# Patient Record
Sex: MALE | Race: WHITE | NOT HISPANIC OR LATINO | Employment: OTHER | URBAN - METROPOLITAN AREA
[De-identification: names, ages, dates, MRNs, and addresses within clinical notes are randomized per-mention and may not be internally consistent; named-entity substitution may affect disease eponyms.]

---

## 2023-10-19 DIAGNOSIS — I10 PRIMARY HYPERTENSION: ICD-10-CM

## 2023-10-19 RX ORDER — METOPROLOL SUCCINATE 25 MG/1
25 TABLET, EXTENDED RELEASE ORAL EVERY 12 HOURS
Qty: 180 TABLET | Refills: 3 | Status: SHIPPED | OUTPATIENT
Start: 2023-10-19 | End: 2023-11-15 | Stop reason: SDUPTHER

## 2023-10-19 RX ORDER — METOPROLOL SUCCINATE 25 MG/1
1 TABLET, EXTENDED RELEASE ORAL EVERY 12 HOURS
COMMUNITY
Start: 2023-07-15 | End: 2023-10-19 | Stop reason: SDUPTHER

## 2023-10-19 NOTE — TELEPHONE ENCOUNTER
Requested Prescriptions     Pending Prescriptions Disp Refills    metoprolol succinate XL (Toprol-XL) 25 mg 24 hr tablet 180 tablet 3     Sig: Take 1 tablet (25 mg) by mouth every 12 hours.     Please send the attached prescription for the patient. They have a follow up scheduled in November. Thank you.    Alcon Whaley MA

## 2023-10-27 ENCOUNTER — TELEPHONE (OUTPATIENT)
Dept: PRIMARY CARE | Facility: CLINIC | Age: 73
End: 2023-10-27
Payer: MEDICARE

## 2023-10-27 DIAGNOSIS — E11.9 TYPE 2 DIABETES MELLITUS WITHOUT COMPLICATION, UNSPECIFIED WHETHER LONG TERM INSULIN USE (MULTI): Primary | ICD-10-CM

## 2023-10-30 RX ORDER — METFORMIN HYDROCHLORIDE 500 MG/1
1000 TABLET, EXTENDED RELEASE ORAL
Qty: 90 TABLET | Refills: 1 | Status: SHIPPED | OUTPATIENT
Start: 2023-10-30 | End: 2024-01-22

## 2023-10-30 RX ORDER — METFORMIN HYDROCHLORIDE 500 MG/1
2 TABLET, EXTENDED RELEASE ORAL
COMMUNITY
Start: 2023-07-14 | End: 2023-10-30 | Stop reason: SDUPTHER

## 2023-11-14 PROBLEM — I24.9 ACUTE CORONARY SYNDROME (MULTI): Status: ACTIVE | Noted: 2023-11-14

## 2023-11-14 PROBLEM — N28.9 RENAL INSUFFICIENCY: Status: ACTIVE | Noted: 2020-07-10

## 2023-11-14 PROBLEM — R00.1 BRADYCARDIA: Status: ACTIVE | Noted: 2023-11-14

## 2023-11-14 PROBLEM — K57.32 DIVERTICULITIS OF COLON: Status: ACTIVE | Noted: 2023-11-14

## 2023-11-14 PROBLEM — R73.01 IMPAIRED FASTING GLUCOSE: Status: ACTIVE | Noted: 2019-02-20

## 2023-11-14 PROBLEM — I21.19 ACUTE ST ELEVATION MYOCARDIAL INFARCTION (STEMI) OF INFERIOR WALL (MULTI): Status: ACTIVE | Noted: 2023-11-14

## 2023-11-14 PROBLEM — N18.1 STAGE 1 CHRONIC KIDNEY DISEASE: Status: ACTIVE | Noted: 2019-04-16

## 2023-11-14 PROBLEM — I10 ESSENTIAL HYPERTENSION: Status: ACTIVE | Noted: 2023-11-14

## 2023-11-14 PROBLEM — R07.9 CHEST PAIN: Status: ACTIVE | Noted: 2023-11-14

## 2023-11-14 PROBLEM — I21.19 INFERIOR MI (MULTI): Status: ACTIVE | Noted: 2018-05-08

## 2023-11-14 PROBLEM — R97.20 ELEVATED PSA: Status: ACTIVE | Noted: 2019-04-16

## 2023-11-14 PROBLEM — C61 MALIGNANT NEOPLASM OF PROSTATE (MULTI): Status: ACTIVE | Noted: 2020-07-10

## 2023-11-14 PROBLEM — R11.2 NAUSEA & VOMITING: Status: ACTIVE | Noted: 2023-11-14

## 2023-11-14 PROBLEM — E78.2 MIXED HYPERLIPIDEMIA: Status: ACTIVE | Noted: 2018-07-25

## 2023-11-14 PROBLEM — R33.9 RETENTION OF URINE: Status: ACTIVE | Noted: 2023-11-14

## 2023-11-14 PROBLEM — I25.118 ATHEROSCLEROTIC HEART DISEASE OF NATIVE CORONARY ARTERY WITH OTHER FORMS OF ANGINA PECTORIS (CMS-HCC): Status: ACTIVE | Noted: 2019-05-30

## 2023-11-14 PROBLEM — N40.1 LOWER URINARY TRACT SYMPTOMS DUE TO BENIGN PROSTATIC HYPERPLASIA: Status: ACTIVE | Noted: 2023-11-14

## 2023-11-14 PROBLEM — N20.0 CALCULUS OF KIDNEY: Status: ACTIVE | Noted: 2023-11-14

## 2023-11-14 PROBLEM — E11.9 DIABETES (MULTI): Status: ACTIVE | Noted: 2019-04-16

## 2023-11-14 PROBLEM — E03.8 SUBCLINICAL HYPOTHYROIDISM: Status: ACTIVE | Noted: 2019-04-16

## 2023-11-14 PROBLEM — I20.9 ANGINA PECTORIS (CMS-HCC): Status: ACTIVE | Noted: 2023-11-14

## 2023-11-14 PROBLEM — K80.20 CHOLELITHIASIS: Status: ACTIVE | Noted: 2023-11-14

## 2023-11-14 PROBLEM — E29.1 TESTICULAR HYPOFUNCTION: Status: ACTIVE | Noted: 2023-11-14

## 2023-11-14 RX ORDER — LEUPROLIDE ACETATE 45 MG
45 KIT SUBCUTANEOUS
COMMUNITY

## 2023-11-14 RX ORDER — METOPROLOL SUCCINATE 25 MG/1
25 TABLET, EXTENDED RELEASE ORAL 2 TIMES DAILY
COMMUNITY
Start: 2019-05-30 | End: 2023-11-15 | Stop reason: SDUPTHER

## 2023-11-14 RX ORDER — ATORVASTATIN CALCIUM 80 MG/1
80 TABLET, FILM COATED ORAL DAILY
COMMUNITY
Start: 2019-07-11 | End: 2024-02-15

## 2023-11-14 RX ORDER — NITROGLYCERIN 0.4 MG/1
0.4 TABLET SUBLINGUAL EVERY 5 MIN PRN
COMMUNITY
Start: 2019-05-30

## 2023-11-14 RX ORDER — TAMSULOSIN HYDROCHLORIDE 0.4 MG/1
0.4 CAPSULE ORAL DAILY
COMMUNITY
End: 2024-03-05 | Stop reason: ALTCHOICE

## 2023-11-14 RX ORDER — SILDENAFIL 100 MG/1
100 TABLET, FILM COATED ORAL AS NEEDED
COMMUNITY
End: 2024-03-05 | Stop reason: ALTCHOICE

## 2023-11-14 RX ORDER — EZETIMIBE 10 MG/1
10 TABLET ORAL DAILY
COMMUNITY

## 2023-11-14 RX ORDER — ASPIRIN 81 MG/1
81 TABLET ORAL DAILY
COMMUNITY

## 2023-11-15 ENCOUNTER — OFFICE VISIT (OUTPATIENT)
Dept: CARDIOLOGY | Facility: CLINIC | Age: 73
End: 2023-11-15
Payer: MEDICARE

## 2023-11-15 VITALS
RESPIRATION RATE: 18 BRPM | OXYGEN SATURATION: 98 % | HEART RATE: 91 BPM | WEIGHT: 152.2 LBS | SYSTOLIC BLOOD PRESSURE: 144 MMHG | HEIGHT: 66 IN | TEMPERATURE: 98.9 F | BODY MASS INDEX: 24.46 KG/M2 | DIASTOLIC BLOOD PRESSURE: 86 MMHG

## 2023-11-15 DIAGNOSIS — R00.1 BRADYCARDIA: ICD-10-CM

## 2023-11-15 DIAGNOSIS — I10 ESSENTIAL HYPERTENSION: ICD-10-CM

## 2023-11-15 DIAGNOSIS — I21.19 INFERIOR MI (MULTI): ICD-10-CM

## 2023-11-15 DIAGNOSIS — I10 PRIMARY HYPERTENSION: ICD-10-CM

## 2023-11-15 DIAGNOSIS — I20.9 ANGINA PECTORIS (CMS-HCC): ICD-10-CM

## 2023-11-15 DIAGNOSIS — E78.2 MIXED HYPERLIPIDEMIA: Primary | ICD-10-CM

## 2023-11-15 PROCEDURE — 99214 OFFICE O/P EST MOD 30 MIN: CPT | Performed by: INTERNAL MEDICINE

## 2023-11-15 PROCEDURE — 1036F TOBACCO NON-USER: CPT | Performed by: INTERNAL MEDICINE

## 2023-11-15 PROCEDURE — 3077F SYST BP >= 140 MM HG: CPT | Performed by: INTERNAL MEDICINE

## 2023-11-15 PROCEDURE — 93000 ELECTROCARDIOGRAM COMPLETE: CPT | Performed by: INTERNAL MEDICINE

## 2023-11-15 PROCEDURE — 3079F DIAST BP 80-89 MM HG: CPT | Performed by: INTERNAL MEDICINE

## 2023-11-15 PROCEDURE — 1126F AMNT PAIN NOTED NONE PRSNT: CPT | Performed by: INTERNAL MEDICINE

## 2023-11-15 PROCEDURE — 1159F MED LIST DOCD IN RCRD: CPT | Performed by: INTERNAL MEDICINE

## 2023-11-15 RX ORDER — METOPROLOL SUCCINATE 50 MG/1
50 TABLET, EXTENDED RELEASE ORAL EVERY 12 HOURS
Qty: 180 TABLET | Refills: 3 | Status: SHIPPED | OUTPATIENT
Start: 2023-11-15 | End: 2024-11-09

## 2023-11-15 ASSESSMENT — ENCOUNTER SYMPTOMS
OCCASIONAL FEELINGS OF UNSTEADINESS: 0
DEPRESSION: 0
LOSS OF SENSATION IN FEET: 0

## 2023-11-15 ASSESSMENT — PAIN SCALES - GENERAL: PAINLEVEL: 0-NO PAIN

## 2023-11-15 ASSESSMENT — PATIENT HEALTH QUESTIONNAIRE - PHQ9
SUM OF ALL RESPONSES TO PHQ9 QUESTIONS 1 AND 2: 0
2. FEELING DOWN, DEPRESSED OR HOPELESS: NOT AT ALL
1. LITTLE INTEREST OR PLEASURE IN DOING THINGS: NOT AT ALL

## 2023-11-15 NOTE — PROGRESS NOTES
history of present illness:  73 year old male patient here today following up on hx of CAD/Inferior STEMI status post PCI to RCA with LYLY, has  of the LAD, has EF of 50%. Patient return to my office for follow-up. For patient has been feeling overall okay. Denies having any chest pain or shortness of breath. Patient denies palpitations lightheadedness or dizziness. EKG today showed normal sinus rhythm nonspecific ST T-wave abnormalities.   Past Medical History:   Diagnosis Date    Acute coronary syndrome (CMS/HCC) 11/14/2023    Acute ST elevation myocardial infarction (STEMI) of inferior wall (CMS/HCC) 11/14/2023    Atherosclerotic heart disease of native coronary artery with other forms of angina pectoris (CMS/HCC) 05/30/2019    Bradycardia 11/14/2023    Chest pain 11/14/2023    Diabetes (CMS/HCC) 04/16/2019    Essential hypertension 11/14/2023    Inferior MI (CMS/HCC) 05/08/2018    Malignant neoplasm of prostate (CMS/HCC) 07/10/2020    Mixed hyperlipidemia 07/25/2018    Stage 1 chronic kidney disease 04/16/2019       History reviewed. No pertinent surgical history.    Allergies   Allergen Reactions    Morphine Rash        reports that he has never smoked. He has never been exposed to tobacco smoke. He has never used smokeless tobacco. He reports that he does not drink alcohol and does not use drugs.    Family History   Family history unknown: Yes       Patient's Medications   New Prescriptions    No medications on file   Previous Medications    ASPIRIN 81 MG EC TABLET    Take 1 tablet (81 mg) by mouth once daily.    ATORVASTATIN (LIPITOR) 80 MG TABLET    Take 1 tablet (80 mg) by mouth once daily.    EZETIMIBE (ZETIA) 10 MG TABLET    Take 1 tablet (10 mg) by mouth once daily.    LEUPROLIDE, 6 MONTH, 45 MG INJECTION    Inject 45 mg under the skin every 6 months.    METFORMIN  MG 24 HR TABLET    Take 2 tablets (1,000 mg) by mouth once daily.    METOPROLOL SUCCINATE XL (TOPROL-XL) 25 MG 24 HR TABLET    Take 1  tablet (25 mg) by mouth every 12 hours.    NITROGLYCERIN (NITROSTAT) 0.4 MG SL TABLET    Place 1 tablet (0.4 mg) under the tongue every 5 minutes if needed for chest pain.    SILDENAFIL (VIAGRA) 100 MG TABLET    Take 1 tablet (100 mg) by mouth if needed for erectile dysfunction.    TAMSULOSIN (FLOMAX) 0.4 MG 24 HR CAPSULE    Take 1 capsule (0.4 mg) by mouth once daily.    TICAGRELOR (BRILINTA) 60 MG TABLET    Take 1 tablet (60 mg) by mouth 2 times a day.   Modified Medications    No medications on file   Discontinued Medications    METOPROLOL SUCCINATE XL (TOPROL-XL) 25 MG 24 HR TABLET    Take 1 tablet (25 mg) by mouth 2 times a day.       Objective   Physical Exam  General: Patient in no acute distress   HEENT: Atraumatic normocephalic.  Neck: Supple, jugular venous pressure within normal limit.  No bruits  Lungs: Clear to auscultation bilaterally  Cardiovascular: Regular rate and rhythm, normal heart sounds, no murmurs rubs or gallops  Abdomen: Soft nontender nondistended.  Normal bowel sounds.  Extremities: Warm to touch, no edema.    Lab Review   No visits with results within 2 Month(s) from this visit.   Latest known visit with results is:   [facility] Encounter on 01/11/2023   Component Date Value    LH - Hgb A1c TR (DATA CO* 01/11/2023 6.6         Assessment/Plan   Patient Active Problem List   Diagnosis    Acute coronary syndrome (CMS/HCC)    Acute ST elevation myocardial infarction (STEMI) of inferior wall (CMS/HCC)    Bradycardia    Atherosclerotic heart disease of native coronary artery with other forms of angina pectoris (CMS/HCC)    Calculus of kidney    Cholelithiasis    Diabetes (CMS/HCC)    Diverticulitis of colon    Elevated PSA    Essential hypertension    Impaired fasting glucose    Angina pectoris (CMS/HCC)    Chest pain    Inferior MI (CMS/HCC)    Lower urinary tract symptoms due to benign prostatic hyperplasia    Malignant neoplasm of prostate (CMS/HCC)    Mixed hyperlipidemia    Nausea & vomiting     Renal insufficiency    Retention of urine    Stage 1 chronic kidney disease    Subclinical hypothyroidism    Testicular hypofunction      73 year old male patient here today following up on hx of CAD/Inferior STEMI status post PCI to RCA with LYLY, has  of the LAD, has EF of 50%. Patient return to my office for follow-up. For patient has been feeling overall okay. Denies having any chest pain or shortness of breath. Patient denies palpitations lightheadedness or dizziness. EKG today showed normal sinus rhythm nonspecific ST T-wave abnormalities. Tolerating his medications well.  [facility] reviewed his labs.  Reviewed his cath films again discussed with him option of stress test as he has  of the LAD distally as well as obtuse marginal 1.  He is feeling good he is hesitant to go for stress test or any repeated cardiac cath since he is feeling okay.  I will increase his metoprolol to 50 mg oral twice daily for better blood pressure control.  Discussed with him lifestyle modification otherwise continue current plan we will follow-up in 6 months.    Alexa Melchor MD

## 2024-01-22 ENCOUNTER — TELEPHONE (OUTPATIENT)
Dept: PRIMARY CARE | Facility: CLINIC | Age: 74
End: 2024-01-22

## 2024-01-22 DIAGNOSIS — E11.9 TYPE 2 DIABETES MELLITUS WITHOUT COMPLICATION, UNSPECIFIED WHETHER LONG TERM INSULIN USE (MULTI): ICD-10-CM

## 2024-01-22 RX ORDER — METFORMIN HYDROCHLORIDE 500 MG/1
1000 TABLET, EXTENDED RELEASE ORAL DAILY
Qty: 90 TABLET | Refills: 1 | Status: SHIPPED | OUTPATIENT
Start: 2024-01-22 | End: 2024-04-17

## 2024-01-22 NOTE — TELEPHONE ENCOUNTER
Please make sure has a chronic condition follow up scheduled in March, last seen 9/15/2023.  Thank you.

## 2024-01-23 DIAGNOSIS — E11.9 TYPE 2 DIABETES MELLITUS WITHOUT COMPLICATION, UNSPECIFIED WHETHER LONG TERM INSULIN USE (MULTI): ICD-10-CM

## 2024-02-15 DIAGNOSIS — E78.2 MIXED HYPERLIPIDEMIA: ICD-10-CM

## 2024-02-15 RX ORDER — ATORVASTATIN CALCIUM 80 MG/1
80 TABLET, FILM COATED ORAL DAILY
Qty: 90 TABLET | Refills: 3 | Status: SHIPPED | OUTPATIENT
Start: 2024-02-15

## 2024-02-15 NOTE — TELEPHONE ENCOUNTER
Requested Prescriptions     Pending Prescriptions Disp Refills    atorvastatin (Lipitor) 80 mg tablet [Pharmacy Med Name: ATORVASTATIN 80 MG TABLET] 90 tablet 3     Sig: TAKE 1 TABLET BY MOUTH EVERY DAY     Please send a refill of patient's medication as soon as possible.  Thank you.

## 2024-02-27 ENCOUNTER — DOCUMENTATION (OUTPATIENT)
Dept: PRIMARY CARE | Facility: CLINIC | Age: 74
End: 2024-02-27
Payer: MEDICARE

## 2024-02-27 DIAGNOSIS — E11.9 TYPE 2 DIABETES MELLITUS WITHOUT COMPLICATION, UNSPECIFIED WHETHER LONG TERM INSULIN USE (MULTI): ICD-10-CM

## 2024-03-01 ASSESSMENT — ENCOUNTER SYMPTOMS
NERVOUS/ANXIOUS: 0
WEAKNESS: 0
POLYDIPSIA: 0
SPEECH DIFFICULTY: 0
FATIGUE: 0
BLURRED VISION: 0
VISUAL CHANGE: 0
WEIGHT LOSS: 0
TREMORS: 0
POLYPHAGIA: 0
SEIZURES: 0
HUNGER: 0
BLACKOUTS: 0
CONFUSION: 0
SWEATS: 0
HEADACHES: 0
DIZZINESS: 0

## 2024-03-05 ENCOUNTER — OFFICE VISIT (OUTPATIENT)
Dept: PRIMARY CARE | Facility: CLINIC | Age: 74
End: 2024-03-05
Payer: MEDICARE

## 2024-03-05 ENCOUNTER — TELEPHONE (OUTPATIENT)
Dept: PRIMARY CARE | Facility: CLINIC | Age: 74
End: 2024-03-05

## 2024-03-05 VITALS
WEIGHT: 149 LBS | OXYGEN SATURATION: 94 % | BODY MASS INDEX: 23.95 KG/M2 | TEMPERATURE: 97.3 F | SYSTOLIC BLOOD PRESSURE: 130 MMHG | DIASTOLIC BLOOD PRESSURE: 78 MMHG | HEIGHT: 66 IN | HEART RATE: 73 BPM

## 2024-03-05 DIAGNOSIS — I20.9 ANGINA PECTORIS (CMS-HCC): ICD-10-CM

## 2024-03-05 DIAGNOSIS — E11.9 TYPE 2 DIABETES MELLITUS WITHOUT COMPLICATION, WITHOUT LONG-TERM CURRENT USE OF INSULIN (MULTI): ICD-10-CM

## 2024-03-05 DIAGNOSIS — I10 ESSENTIAL HYPERTENSION: ICD-10-CM

## 2024-03-05 DIAGNOSIS — E78.2 MIXED HYPERLIPIDEMIA: ICD-10-CM

## 2024-03-05 DIAGNOSIS — E11.9 TYPE 2 DIABETES MELLITUS WITHOUT COMPLICATION, WITHOUT LONG-TERM CURRENT USE OF INSULIN (MULTI): Primary | ICD-10-CM

## 2024-03-05 DIAGNOSIS — E11.9 TYPE 2 DIABETES MELLITUS WITHOUT COMPLICATION, UNSPECIFIED WHETHER LONG TERM INSULIN USE (MULTI): ICD-10-CM

## 2024-03-05 DIAGNOSIS — Z00.00 WELL ADULT EXAM: ICD-10-CM

## 2024-03-05 PROCEDURE — 3075F SYST BP GE 130 - 139MM HG: CPT

## 2024-03-05 PROCEDURE — 3078F DIAST BP <80 MM HG: CPT

## 2024-03-05 PROCEDURE — 99214 OFFICE O/P EST MOD 30 MIN: CPT

## 2024-03-05 PROCEDURE — 1157F ADVNC CARE PLAN IN RCRD: CPT

## 2024-03-05 PROCEDURE — 1159F MED LIST DOCD IN RCRD: CPT

## 2024-03-05 PROCEDURE — 1160F RVW MEDS BY RX/DR IN RCRD: CPT

## 2024-03-05 PROCEDURE — 1036F TOBACCO NON-USER: CPT

## 2024-03-05 PROCEDURE — 1126F AMNT PAIN NOTED NONE PRSNT: CPT

## 2024-03-05 ASSESSMENT — ENCOUNTER SYMPTOMS
NERVOUS/ANXIOUS: 0
BLURRED VISION: 0
TREMORS: 0
WEIGHT LOSS: 0
SWEATS: 0
POLYDIPSIA: 0
VISUAL CHANGE: 0
CONFUSION: 0
POLYPHAGIA: 0
HUNGER: 0
BLACKOUTS: 0
SPEECH DIFFICULTY: 0
FATIGUE: 0
HEADACHES: 0
WEAKNESS: 0
DIZZINESS: 0
SEIZURES: 0

## 2024-03-05 ASSESSMENT — PATIENT HEALTH QUESTIONNAIRE - PHQ9
SUM OF ALL RESPONSES TO PHQ9 QUESTIONS 1 AND 2: 0
1. LITTLE INTEREST OR PLEASURE IN DOING THINGS: NOT AT ALL
2. FEELING DOWN, DEPRESSED OR HOPELESS: NOT AT ALL

## 2024-03-05 ASSESSMENT — PAIN SCALES - GENERAL: PAINLEVEL: 0-NO PAIN

## 2024-03-05 NOTE — PROGRESS NOTES
Subjective   Patient ID: Tej Diane is a 73 y.o. male who presents for Diabetes.    Diabetes  He has type 2 diabetes mellitus. No MedicAlert identification noted. The initial diagnosis of diabetes was made 2 years ago. Pertinent negatives for hypoglycemia include no confusion, dizziness, headaches, hunger, mood changes, nervousness/anxiousness, pallor, seizures, sleepiness, speech difficulty, sweats or tremors. Pertinent negatives for diabetes include no blurred vision, no chest pain, no fatigue, no foot paresthesias, no foot ulcerations, no polydipsia, no polyphagia, no polyuria, no visual change, no weakness and no weight loss. Pertinent negatives for hypoglycemia complications include no blackouts, no hospitalization, no nocturnal hypoglycemia, no required assistance and no required glucagon injection. Pertinent negatives for diabetic complications include no CVA, heart disease, impotence, nephropathy, peripheral neuropathy, PVD or retinopathy. Risk factors for coronary artery disease include dyslipidemia, family history and hypertension. When asked about current treatments, none were reported. He is compliant with treatment all of the time. He is following a generally unhealthy diet. When asked about meal planning, he reported none. He has not had a previous visit with a dietitian. He participates in exercise intermittently. He does not see a podiatrist.Eye exam is current.      A1C 6.8%    Medications:  Metformin 500 mg BID    Does not check home glucose levels.     Sees eye specialist annually for retnal scan- Last visit May 2023  Foot exam:  Renal function: BUN 21/ Cr 0.91/ eGFR 89    Hypertension  Presents for follow up of essential hypertension.   Taking  metoprolol 50 mg BID and tolerating medications.   Denies change in vision, headache, or dizziness.   No complaints of chest pain, palpitations, or shortness of breath.  Follows up with cardiology, Dr. Melchor, in May 2024    Hyperlipidemia   - Lipid  "Panel:   Lab Results   Component Value Date    CHOL 105 (L) 05/10/2019    HDL 29 (L) 05/10/2019    LDLCALC 61 (L) 05/10/2019    TRIG 76 05/10/2019     - Taking atorvastatin 80 mg daily  - Denies myalgia or muscle weakness      Review of Systems   Constitutional:  Negative for fatigue and weight loss.   Eyes:  Negative for blurred vision.   Cardiovascular:  Negative for chest pain.   Endocrine: Negative for polydipsia, polyphagia and polyuria.   Genitourinary:  Negative for impotence.   Skin:  Negative for pallor.   Neurological:  Negative for dizziness, tremors, seizures, speech difficulty, weakness and headaches.   Psychiatric/Behavioral:  Negative for confusion. The patient is not nervous/anxious.        Objective   /78 (BP Location: Left arm)   Pulse 73   Temp 36.3 °C (97.3 °F) (Temporal)   Ht 1.676 m (5' 6\")   Wt 67.6 kg (149 lb)   SpO2 94%   BMI 24.05 kg/m²     Physical Exam  Vitals and nursing note reviewed.   Constitutional:       General: He is not in acute distress.     Appearance: Normal appearance. He is normal weight. He is not ill-appearing.   Cardiovascular:      Rate and Rhythm: Normal rate and regular rhythm.      Pulses: Normal pulses.      Heart sounds: Normal heart sounds.   Pulmonary:      Effort: Pulmonary effort is normal.      Breath sounds: Normal breath sounds.   Abdominal:      General: Abdomen is flat. Bowel sounds are normal.      Palpations: Abdomen is soft.   Musculoskeletal:      Cervical back: No tenderness.   Lymphadenopathy:      Cervical: No cervical adenopathy.   Skin:     General: Skin is warm.      Capillary Refill: Capillary refill takes less than 2 seconds.   Neurological:      General: No focal deficit present.      Mental Status: He is alert.      Cranial Nerves: No cranial nerve deficit.   Psychiatric:         Mood and Affect: Mood normal.         Behavior: Behavior normal.         Thought Content: Thought content normal.         Judgment: Judgment normal. "         Assessment/Plan   Problem List Items Addressed This Visit             ICD-10-CM    Diabetes (CMS/[facility]) - Primary  A1C 6.8%, stable.   Continue metformin as prescribed  Discussed medication desired effects, potential side effects, and how to administer the medication.   Check a fasting sugar first thing in the AM once daily and keep a log of the results to bring to your next office visit.  Please contact office if your sugars are consistently >140.  Non-pharmacological interventions such as low carb diet, high in vegetables and fruit discussed.   Educated on importance of physical activity.   Discussed signs and symptoms of hypoglycemia and need to present to the ED.   Follow up in 6 months or sooner if needed.   Patient verbalizes understanding regarding plan of care and all questions answered.   E11.9    Essential hypertension  Chronic condition, stable.  /78  Continue metformin 50 mg BID medication for hypertension.   Continue to monitor blood pressure.  Call if blood pressure consistently >140/90.  Non pharmacological interventions such as low salt, cardiac diet discussed.  Increase physical activity as able.  Stress reduction interventions discussed.  Discussed signs and symptoms of major cardiovascular event and need to present to the ED.   Reevaluate in 6 months.  Patient verbalizes understanding regarding plan of care and all questions answered.   I10    Angina pectoris (CMS/HCC)  Chronic condition, stable  May use nitroglycerine SL as needed for episodes of angina  Continue to follow with cardiology as scheduled.  I20.9    Mixed hyperlipidemia  Chronic condition, stable  Continue atorvastatin 80 mg daily  Lipid levels reviewed with patient  Follow up in 6 months. E78.2

## 2024-03-20 LAB — HEMOGLOBIN A1C/HEMOGLOBIN TOTAL IN BLOOD EXTERNAL: 6.8 %

## 2024-04-15 ENCOUNTER — TELEPHONE (OUTPATIENT)
Dept: CARDIOLOGY | Facility: CLINIC | Age: 74
End: 2024-04-15
Payer: MEDICARE

## 2024-04-15 NOTE — TELEPHONE ENCOUNTER
Spoke with patient and advised need to reschedule appointment. Patient not home at time of call and wished to call back tomorrow to reschedule.

## 2024-04-17 DIAGNOSIS — E11.9 TYPE 2 DIABETES MELLITUS WITHOUT COMPLICATION, UNSPECIFIED WHETHER LONG TERM INSULIN USE (MULTI): ICD-10-CM

## 2024-04-17 RX ORDER — METFORMIN HYDROCHLORIDE 500 MG/1
1000 TABLET, EXTENDED RELEASE ORAL DAILY
Qty: 180 TABLET | Refills: 1 | Status: SHIPPED | OUTPATIENT
Start: 2024-04-17

## 2024-05-15 ENCOUNTER — APPOINTMENT (OUTPATIENT)
Dept: CARDIOLOGY | Facility: CLINIC | Age: 74
End: 2024-05-15
Payer: MEDICARE

## 2024-05-29 ENCOUNTER — OFFICE VISIT (OUTPATIENT)
Dept: CARDIOLOGY | Facility: CLINIC | Age: 74
End: 2024-05-29
Payer: MEDICARE

## 2024-05-29 VITALS
OXYGEN SATURATION: 98 % | DIASTOLIC BLOOD PRESSURE: 72 MMHG | SYSTOLIC BLOOD PRESSURE: 130 MMHG | WEIGHT: 150 LBS | BODY MASS INDEX: 24.11 KG/M2 | HEART RATE: 67 BPM | HEIGHT: 66 IN | RESPIRATION RATE: 18 BRPM | TEMPERATURE: 98.6 F

## 2024-05-29 DIAGNOSIS — E78.2 MIXED HYPERLIPIDEMIA: Primary | ICD-10-CM

## 2024-05-29 DIAGNOSIS — R00.1 BRADYCARDIA: ICD-10-CM

## 2024-05-29 DIAGNOSIS — I21.19 INFERIOR MI (MULTI): ICD-10-CM

## 2024-05-29 DIAGNOSIS — R07.2 PRECORDIAL PAIN: ICD-10-CM

## 2024-05-29 DIAGNOSIS — R06.02 SOB (SHORTNESS OF BREATH): ICD-10-CM

## 2024-05-29 DIAGNOSIS — I10 ESSENTIAL HYPERTENSION: ICD-10-CM

## 2024-05-29 PROCEDURE — 1157F ADVNC CARE PLAN IN RCRD: CPT | Performed by: INTERNAL MEDICINE

## 2024-05-29 PROCEDURE — 1126F AMNT PAIN NOTED NONE PRSNT: CPT | Performed by: INTERNAL MEDICINE

## 2024-05-29 PROCEDURE — 1159F MED LIST DOCD IN RCRD: CPT | Performed by: INTERNAL MEDICINE

## 2024-05-29 PROCEDURE — 3044F HG A1C LEVEL LT 7.0%: CPT | Performed by: INTERNAL MEDICINE

## 2024-05-29 PROCEDURE — 1160F RVW MEDS BY RX/DR IN RCRD: CPT | Performed by: INTERNAL MEDICINE

## 2024-05-29 PROCEDURE — 99214 OFFICE O/P EST MOD 30 MIN: CPT | Performed by: INTERNAL MEDICINE

## 2024-05-29 PROCEDURE — 1036F TOBACCO NON-USER: CPT | Performed by: INTERNAL MEDICINE

## 2024-05-29 PROCEDURE — 3075F SYST BP GE 130 - 139MM HG: CPT | Performed by: INTERNAL MEDICINE

## 2024-05-29 PROCEDURE — 3078F DIAST BP <80 MM HG: CPT | Performed by: INTERNAL MEDICINE

## 2024-05-29 ASSESSMENT — ENCOUNTER SYMPTOMS
LOSS OF SENSATION IN FEET: 0
DEPRESSION: 0
OCCASIONAL FEELINGS OF UNSTEADINESS: 0

## 2024-05-29 ASSESSMENT — PATIENT HEALTH QUESTIONNAIRE - PHQ9
2. FEELING DOWN, DEPRESSED OR HOPELESS: NOT AT ALL
SUM OF ALL RESPONSES TO PHQ9 QUESTIONS 1 AND 2: 0
1. LITTLE INTEREST OR PLEASURE IN DOING THINGS: NOT AT ALL

## 2024-05-29 ASSESSMENT — LIFESTYLE VARIABLES
HOW OFTEN DO YOU HAVE A DRINK CONTAINING ALCOHOL: NEVER
HOW OFTEN DO YOU HAVE SIX OR MORE DRINKS ON ONE OCCASION: NEVER
SKIP TO QUESTIONS 9-10: 1
AUDIT-C TOTAL SCORE: 0
HOW MANY STANDARD DRINKS CONTAINING ALCOHOL DO YOU HAVE ON A TYPICAL DAY: PATIENT DOES NOT DRINK

## 2024-05-29 ASSESSMENT — PAIN SCALES - GENERAL: PAINLEVEL: 0-NO PAIN

## 2024-05-29 NOTE — PROGRESS NOTES
History of present illness:    Past Medical History:   Diagnosis Date    Acute coronary syndrome (Multi) 11/14/2023    Acute ST elevation myocardial infarction (STEMI) of inferior wall (Multi) 11/14/2023    Atherosclerotic heart disease of native coronary artery with other forms of angina pectoris (CMS-[facility]) 05/30/2019    Bradycardia 11/14/2023    Chest pain 11/14/2023    Diabetes (Multi) 04/16/2019    Essential hypertension 11/14/2023    Inferior MI (Multi) 05/08/2018    Malignant neoplasm of prostate (Multi) 07/10/2020    Mixed hyperlipidemia 07/25/2018    Stage 1 chronic kidney disease 04/16/2019       Past Surgical History:   Procedure Laterality Date    VASECTOMY         Allergies   Allergen Reactions    Morphine Rash        reports that he has never smoked. He has never been exposed to tobacco smoke. He has never used smokeless tobacco. He reports that he does not drink alcohol and does not use drugs.    Family History   Problem Relation Name Age of Onset    Heart disease Father Father     Heart attack Father Father        Patient's Medications   New Prescriptions    No medications on file   Previous Medications    ASPIRIN 81 MG EC TABLET    Take 1 tablet (81 mg) by mouth once daily.    ATORVASTATIN (LIPITOR) 80 MG TABLET    TAKE 1 TABLET BY MOUTH EVERY DAY    EZETIMIBE (ZETIA) 10 MG TABLET    Take 1 tablet (10 mg) by mouth once daily.    LEUPROLIDE, 6 MONTH, 45 MG INJECTION    Inject 45 mg under the skin every 6 months.    METFORMIN  MG 24 HR TABLET    TAKE 2 TABLETS (1000 MG) BY MOUTH DAILY    METOPROLOL SUCCINATE XL (TOPROL-XL) 50 MG 24 HR TABLET    Take 1 tablet (50 mg) by mouth every 12 hours.    NITROGLYCERIN (NITROSTAT) 0.4 MG SL TABLET    Place 1 tablet (0.4 mg) under the tongue every 5 minutes if needed for chest pain.   Modified Medications    No medications on file   Discontinued Medications    No medications on file       Objective   Physical Exam  General: Patient in no acute distress    HEENT: Atraumatic normocephalic.  Neck: Supple, jugular venous pressure within normal limit.  No bruits  Lungs: Clear to auscultation bilaterally  Cardiovascular: Regular rate and rhythm, normal heart sounds, no murmurs rubs or gallops  Abdomen: Soft nontender nondistended.  Normal bowel sounds.  Extremities: Warm to touch, no edema.      Lab Review   No visits with results within 2 Month(s) from this visit.   Latest known visit with results is:   Documentation on 02/27/2024   Component Date Value    Hemoglobin A1C External 02/26/2024 6.8         Assessment/Plan   Patient Active Problem List   Diagnosis    Acute coronary syndrome (Multi)    Acute ST elevation myocardial infarction (STEMI) of inferior wall (Multi)    Bradycardia    Atherosclerotic heart disease of native coronary artery with other forms of angina pectoris (CMS-HCC)    Calculus of kidney    Cholelithiasis    Diabetes (Multi)    Diverticulitis of colon    Elevated PSA    Essential hypertension    Impaired fasting glucose    Angina pectoris (CMS-HCC)    Chest pain    Inferior MI (Multi)    Lower urinary tract symptoms due to benign prostatic hyperplasia    Malignant neoplasm of prostate (Multi)    Mixed hyperlipidemia    Nausea & vomiting    Renal insufficiency    Retention of urine    Stage 1 chronic kidney disease    Subclinical hypothyroidism    Testicular hypofunction      73 year old male patient here today following up on hx of CAD/Inferior STEMI status post PCI to RCA with LYLY, has  of the LAD, has EF of 50%. Patient return to my office for follow-up. For patient has been feeling overall okay. Denies having any chest pain or shortness of breath. Patient denies palpitations lightheadedness or dizziness. Tolerating his medications well.  [facility] reviewed his labs.  Reviewed his cath films again discussed with him option of stress test as he has  of the LAD distally as well as obtuse marginal 1.  He is feeling good he is hesitant to go for  stress test or any repeated cardiac cath since he is feeling okay.  I will obtain 2D echo to assess ejection fraction.  He is going to have also comprehensive blood workup including lipid panel with his primary care physician will follow-up on the result otherwise he stable we will follow-up in 6 months    Alexa Melchor MD

## 2024-06-13 ENCOUNTER — HOSPITAL ENCOUNTER (OUTPATIENT)
Dept: CARDIOLOGY | Facility: CLINIC | Age: 74
Discharge: HOME | End: 2024-06-13
Payer: MEDICARE

## 2024-06-13 DIAGNOSIS — I21.19 INFERIOR MI (MULTI): ICD-10-CM

## 2024-06-13 DIAGNOSIS — I21.29 ST ELEVATION (STEMI) MYOCARDIAL INFARCTION INVOLVING OTHER SITES (MULTI): ICD-10-CM

## 2024-06-13 PROCEDURE — 93306 TTE W/DOPPLER COMPLETE: CPT

## 2024-06-13 PROCEDURE — 93306 TTE W/DOPPLER COMPLETE: CPT | Performed by: INTERNAL MEDICINE

## 2024-06-15 LAB
AORTIC VALVE PEAK VELOCITY: 1.1 M/S
AV PEAK GRADIENT: 4.9 MMHG
AVA (PEAK VEL): 2.77 CM2
EJECTION FRACTION APICAL 4 CHAMBER: 61.7
LEFT ATRIUM VOLUME AREA LENGTH INDEX BSA: 30.4 ML/M2
LEFT VENTRICLE INTERNAL DIMENSION DIASTOLE: 4.47 CM (ref 3.5–6)
LEFT VENTRICULAR OUTFLOW TRACT DIAMETER: 2.03 CM
LV EJECTION FRACTION BIPLANE: 63 %
MITRAL VALVE E/A RATIO: 0.68
MITRAL VALVE E/E' RATIO: 10.35
RIGHT VENTRICLE FREE WALL PEAK S': 10.13 CM/S
RIGHT VENTRICLE PEAK SYSTOLIC PRESSURE: 36.7 MMHG
TRICUSPID ANNULAR PLANE SYSTOLIC EXCURSION: 1.6 CM

## 2024-06-18 ENCOUNTER — APPOINTMENT (OUTPATIENT)
Dept: UROLOGY | Facility: HOSPITAL | Age: 74
End: 2024-06-18
Payer: MEDICARE

## 2024-06-21 DIAGNOSIS — E78.2 MIXED HYPERLIPIDEMIA: ICD-10-CM

## 2024-06-21 NOTE — TELEPHONE ENCOUNTER
Patients pharmacy is requesting refill of the following medication(s) for a 90 day supply with refills.   Please send the attached prescription(s) as soon as possible.   Thank you.    Requested Prescriptions     Pending Prescriptions Disp Refills    ezetimibe (Zetia) 10 mg tablet 90 tablet 3     Sig: Take 1 tablet (10 mg) by mouth once daily.

## 2024-06-22 RX ORDER — EZETIMIBE 10 MG/1
10 TABLET ORAL DAILY
Qty: 90 TABLET | Refills: 3 | Status: SHIPPED | OUTPATIENT
Start: 2024-06-22 | End: 2025-06-17

## 2024-06-25 ENCOUNTER — TELEPHONE (OUTPATIENT)
Dept: CARDIOLOGY | Facility: CLINIC | Age: 74
End: 2024-06-25
Payer: MEDICARE

## 2024-06-25 NOTE — TELEPHONE ENCOUNTER
----- Message from Alexa Melchor MD sent at 6/19/2024  3:32 PM EDT -----  Tell patient that his echo was okay.  Let me see him as scheduled  ----- Message -----  From: Caden Andersono - Cardiology Results In  Sent: 6/15/2024   2:38 PM EDT  To: Alexa Melchor MD

## 2024-09-09 ENCOUNTER — LAB (OUTPATIENT)
Dept: LAB | Facility: LAB | Age: 74
End: 2024-09-09
Payer: MEDICARE

## 2024-09-09 DIAGNOSIS — E11.9 TYPE 2 DIABETES MELLITUS WITHOUT COMPLICATION, WITHOUT LONG-TERM CURRENT USE OF INSULIN (MULTI): ICD-10-CM

## 2024-09-09 DIAGNOSIS — Z00.00 WELL ADULT EXAM: ICD-10-CM

## 2024-09-09 DIAGNOSIS — E11.9 TYPE 2 DIABETES MELLITUS WITHOUT COMPLICATION, UNSPECIFIED WHETHER LONG TERM INSULIN USE (MULTI): ICD-10-CM

## 2024-09-09 LAB
ALBUMIN SERPL BCP-MCNC: 4.3 G/DL (ref 3.4–5)
ALP SERPL-CCNC: 69 U/L (ref 33–136)
ALT SERPL W P-5'-P-CCNC: 24 U/L (ref 10–52)
ANION GAP SERPL CALC-SCNC: 13 MMOL/L (ref 10–20)
APPEARANCE UR: CLEAR
AST SERPL W P-5'-P-CCNC: 17 U/L (ref 9–39)
BACTERIA #/AREA URNS AUTO: ABNORMAL /HPF
BASOPHILS # BLD AUTO: 0.05 X10*3/UL (ref 0–0.1)
BASOPHILS NFR BLD AUTO: 1 %
BILIRUB SERPL-MCNC: 0.9 MG/DL (ref 0–1.2)
BILIRUB UR STRIP.AUTO-MCNC: NEGATIVE MG/DL
BUN SERPL-MCNC: 24 MG/DL (ref 6–23)
CALCIUM SERPL-MCNC: 9.8 MG/DL (ref 8.6–10.6)
CHLORIDE SERPL-SCNC: 106 MMOL/L (ref 98–107)
CHOLEST SERPL-MCNC: 99 MG/DL (ref 0–199)
CHOLESTEROL/HDL RATIO: 2.5
CO2 SERPL-SCNC: 28 MMOL/L (ref 21–32)
COLOR UR: ABNORMAL
CREAT SERPL-MCNC: 1.02 MG/DL (ref 0.5–1.3)
EGFRCR SERPLBLD CKD-EPI 2021: 77 ML/MIN/1.73M*2
EOSINOPHIL # BLD AUTO: 0.13 X10*3/UL (ref 0–0.4)
EOSINOPHIL NFR BLD AUTO: 2.5 %
ERYTHROCYTE [DISTWIDTH] IN BLOOD BY AUTOMATED COUNT: 13.6 % (ref 11.5–14.5)
EST. AVERAGE GLUCOSE BLD GHB EST-MCNC: 137 MG/DL
GLUCOSE SERPL-MCNC: 109 MG/DL (ref 74–99)
GLUCOSE UR STRIP.AUTO-MCNC: NORMAL MG/DL
HBA1C MFR BLD: 6.4 %
HCT VFR BLD AUTO: 42.4 % (ref 41–52)
HDLC SERPL-MCNC: 39.6 MG/DL
HGB BLD-MCNC: 13.7 G/DL (ref 13.5–17.5)
HYALINE CASTS #/AREA URNS AUTO: ABNORMAL /LPF
IMM GRANULOCYTES # BLD AUTO: 0.03 X10*3/UL (ref 0–0.5)
IMM GRANULOCYTES NFR BLD AUTO: 0.6 % (ref 0–0.9)
KETONES UR STRIP.AUTO-MCNC: NEGATIVE MG/DL
LDLC SERPL CALC-MCNC: 46 MG/DL
LEUKOCYTE ESTERASE UR QL STRIP.AUTO: ABNORMAL
LYMPHOCYTES # BLD AUTO: 1.52 X10*3/UL (ref 0.8–3)
LYMPHOCYTES NFR BLD AUTO: 29.2 %
MCH RBC QN AUTO: 31.1 PG (ref 26–34)
MCHC RBC AUTO-ENTMCNC: 32.3 G/DL (ref 32–36)
MCV RBC AUTO: 96 FL (ref 80–100)
MONOCYTES # BLD AUTO: 0.52 X10*3/UL (ref 0.05–0.8)
MONOCYTES NFR BLD AUTO: 10 %
MUCOUS THREADS #/AREA URNS AUTO: ABNORMAL /LPF
NEUTROPHILS # BLD AUTO: 2.96 X10*3/UL (ref 1.6–5.5)
NEUTROPHILS NFR BLD AUTO: 56.7 %
NITRITE UR QL STRIP.AUTO: NEGATIVE
NON HDL CHOLESTEROL: 59 MG/DL (ref 0–149)
NRBC BLD-RTO: 0 /100 WBCS (ref 0–0)
PH UR STRIP.AUTO: 6.5 [PH]
PLATELET # BLD AUTO: 247 X10*3/UL (ref 150–450)
POTASSIUM SERPL-SCNC: 4.7 MMOL/L (ref 3.5–5.3)
PROT SERPL-MCNC: 6.4 G/DL (ref 6.4–8.2)
PROT UR STRIP.AUTO-MCNC: NEGATIVE MG/DL
RBC # BLD AUTO: 4.4 X10*6/UL (ref 4.5–5.9)
RBC # UR STRIP.AUTO: ABNORMAL /UL
RBC #/AREA URNS AUTO: ABNORMAL /HPF
SODIUM SERPL-SCNC: 142 MMOL/L (ref 136–145)
SP GR UR STRIP.AUTO: 1.02
SQUAMOUS #/AREA URNS AUTO: ABNORMAL /HPF
TRIGL SERPL-MCNC: 66 MG/DL (ref 0–149)
UROBILINOGEN UR STRIP.AUTO-MCNC: NORMAL MG/DL
VLDL: 13 MG/DL (ref 0–40)
WBC # BLD AUTO: 5.2 X10*3/UL (ref 4.4–11.3)
WBC #/AREA URNS AUTO: ABNORMAL /HPF

## 2024-09-09 PROCEDURE — 36415 COLL VENOUS BLD VENIPUNCTURE: CPT

## 2024-09-18 ENCOUNTER — APPOINTMENT (OUTPATIENT)
Dept: PRIMARY CARE | Facility: CLINIC | Age: 74
End: 2024-09-18
Payer: MEDICARE

## 2024-09-18 ENCOUNTER — TELEPHONE (OUTPATIENT)
Dept: PRIMARY CARE | Facility: CLINIC | Age: 74
End: 2024-09-18

## 2024-09-18 VITALS
SYSTOLIC BLOOD PRESSURE: 130 MMHG | OXYGEN SATURATION: 98 % | WEIGHT: 148 LBS | BODY MASS INDEX: 23.23 KG/M2 | DIASTOLIC BLOOD PRESSURE: 76 MMHG | HEIGHT: 67 IN | HEART RATE: 60 BPM | RESPIRATION RATE: 18 BRPM

## 2024-09-18 DIAGNOSIS — E11.9 TYPE 2 DIABETES MELLITUS WITHOUT COMPLICATION, WITHOUT LONG-TERM CURRENT USE OF INSULIN (MULTI): ICD-10-CM

## 2024-09-18 DIAGNOSIS — Z00.00 ROUTINE GENERAL MEDICAL EXAMINATION AT HEALTH CARE FACILITY: Primary | ICD-10-CM

## 2024-09-18 DIAGNOSIS — I10 ESSENTIAL HYPERTENSION: ICD-10-CM

## 2024-09-18 DIAGNOSIS — E78.2 MIXED HYPERLIPIDEMIA: ICD-10-CM

## 2024-09-18 DIAGNOSIS — H61.23 EXCESSIVE CERUMEN IN BOTH EAR CANALS: ICD-10-CM

## 2024-09-18 PROCEDURE — 1170F FXNL STATUS ASSESSED: CPT

## 2024-09-18 PROCEDURE — 3078F DIAST BP <80 MM HG: CPT

## 2024-09-18 PROCEDURE — 1157F ADVNC CARE PLAN IN RCRD: CPT

## 2024-09-18 PROCEDURE — 3008F BODY MASS INDEX DOCD: CPT

## 2024-09-18 PROCEDURE — 1126F AMNT PAIN NOTED NONE PRSNT: CPT

## 2024-09-18 PROCEDURE — 1158F ADVNC CARE PLAN TLK DOCD: CPT

## 2024-09-18 PROCEDURE — G0439 PPPS, SUBSEQ VISIT: HCPCS

## 2024-09-18 PROCEDURE — 1159F MED LIST DOCD IN RCRD: CPT

## 2024-09-18 PROCEDURE — 1036F TOBACCO NON-USER: CPT

## 2024-09-18 PROCEDURE — 1123F ACP DISCUSS/DSCN MKR DOCD: CPT

## 2024-09-18 PROCEDURE — 99213 OFFICE O/P EST LOW 20 MIN: CPT

## 2024-09-18 PROCEDURE — 3044F HG A1C LEVEL LT 7.0%: CPT

## 2024-09-18 PROCEDURE — 3075F SYST BP GE 130 - 139MM HG: CPT

## 2024-09-18 PROCEDURE — 1160F RVW MEDS BY RX/DR IN RCRD: CPT

## 2024-09-18 PROCEDURE — 3048F LDL-C <100 MG/DL: CPT

## 2024-09-18 ASSESSMENT — ACTIVITIES OF DAILY LIVING (ADL)
MANAGING_FINANCES: INDEPENDENT
DOING_HOUSEWORK: INDEPENDENT
BATHING: INDEPENDENT
GROCERY_SHOPPING: INDEPENDENT
DRESSING: INDEPENDENT
TAKING_MEDICATION: INDEPENDENT

## 2024-09-18 ASSESSMENT — PATIENT HEALTH QUESTIONNAIRE - PHQ9
1. LITTLE INTEREST OR PLEASURE IN DOING THINGS: NOT AT ALL
SUM OF ALL RESPONSES TO PHQ9 QUESTIONS 1 AND 2: 0
2. FEELING DOWN, DEPRESSED OR HOPELESS: NOT AT ALL

## 2024-09-18 ASSESSMENT — ENCOUNTER SYMPTOMS
OCCASIONAL FEELINGS OF UNSTEADINESS: 0
DEPRESSION: 0
LOSS OF SENSATION IN FEET: 0

## 2024-09-18 ASSESSMENT — PAIN SCALES - GENERAL: PAINLEVEL: 0-NO PAIN

## 2024-09-18 NOTE — PROGRESS NOTES
Subjective   Reason for Visit: Tej Diane is an 74 y.o. male here for a Medicare Wellness visit.     Past Medical, Surgical, and Family History reviewed and updated in chart.    Reviewed all medications by prescribing practitioner or clinical pharmacist (such as prescriptions, OTCs, herbal therapies and supplements) and documented in the medical record.      PMH  HTN- taking Toprol XL 50 BID, follows with cardiology, Dr. Melchor, last appointment May 2024  Stable angina- nitroglycerin prn  HLD- atorvastatin 80 mg daily, ezetimibe 10 mg daily  DM-  6.4%, continues metformin 500 mg-2 tablets daily  Hx prostate cancer- follows with Dr. Farrell every 6 months, Lupron injections every 6 months  Recent basal cell carcinoma- saw [facility]- area along right clavicle removed    Patient Care Team:  JANICE Lynn-CNP as PCP - General (Family Medicine)  Alexa Melchor MD [facility]    Current Outpatient Medications:     aspirin 81 mg EC tablet, Take 1 tablet (81 mg) by mouth once daily., Disp: , Rfl:     atorvastatin (Lipitor) 80 mg tablet, TAKE 1 TABLET BY MOUTH EVERY DAY, Disp: 90 tablet, Rfl: 3    ezetimibe (Zetia) 10 mg tablet, Take 1 tablet (10 mg) by mouth once daily., Disp: 90 tablet, Rfl: 3    leuprolide, 6 month, 45 mg injection, Inject 45 mg under the skin every 6 months., Disp: , Rfl:     metFORMIN  mg 24 hr tablet, TAKE 2 TABLETS (1000 MG) BY MOUTH DAILY, Disp: 180 tablet, Rfl: 1    metoprolol succinate XL (Toprol-XL) 50 mg 24 hr tablet, Take 1 tablet (50 mg) by mouth every 12 hours., Disp: 180 tablet, Rfl: 3    nitroglycerin (Nitrostat) 0.4 mg SL tablet, Place 1 tablet (0.4 mg) under the tongue every 5 minutes if needed for chest pain., Disp: , Rfl:     Social Hx:   51 years  Retired  Smoking: No  Alcohol: Yes - rarely  Recreational drug use: No      Screening tools:  EKG: Yes - follows with cardiology  Low density chest CT: No- not  "indicated  Colonoscopy: Yes - due for updated with Dr. Griffin 11/2025  PSA: Follows with urology      Vaccinations:  Tdap: UTD until 7/2025  Flu Vaccine: declines  Shingles: declines  Prevnar 13: 2017   Prevnar 20: will consider for further  Pneumovax: yes- 2018    Review of Systems  GENERAL - Denies fever/chills, recent illness, unexplained weight loss  HEENT- Denies change in vision, double vision, blurred. Denies hearing changes, ear pain. Denies nose bleeds. Denies sore throat, difficulty swallowing.    RESP - Denies SOB or cough  CVS - Denies CP, palpitations  GI - Denies nausea or abdominal pain, hematochezia/melena  - Denies urinary frequency, urgency or incontinence.  Denies nocturia.   NEURO - Denies headache, dizziness  MSK - Denies joint, neck or back pain  Skin - Denies abnormal lesions, rash  PSYCH-Denies anxiety, depression, changes in mood      Objective   Vitals:  /76 (BP Location: Left arm, Patient Position: Sitting, BP Cuff Size: Large adult)   Pulse 60   Resp 18   Ht 1.689 m (5' 6.5\")   Wt 67.1 kg (148 lb)   SpO2 98%   BMI 23.53 kg/m²       Physical Exam  Vitals and nursing note reviewed.   Constitutional:       General: He is not in acute distress.     Appearance: Normal appearance. He is well-developed and well-groomed.   HENT:      Head: Normocephalic.      Right Ear: Hearing, ear canal and external ear normal. There is impacted cerumen.      Left Ear: Hearing, ear canal and external ear normal. There is impacted cerumen.      Nose: Nose normal.      Mouth/Throat:      Lips: Pink.      Mouth: Mucous membranes are moist.      Pharynx: Oropharynx is clear. Uvula midline.   Eyes:      General: Lids are normal. Vision grossly intact. Gaze aligned appropriately.      Extraocular Movements: Extraocular movements intact.      Conjunctiva/sclera: Conjunctivae normal.      Pupils: Pupils are equal, round, and reactive to light.   Neck:      Thyroid: No thyroid mass or thyromegaly.     "  Vascular: No carotid bruit or JVD.      Trachea: Trachea and phonation normal.   Cardiovascular:      Rate and Rhythm: Normal rate and regular rhythm.      Pulses: Normal pulses.      Heart sounds: Normal heart sounds, S1 normal and S2 normal.   Pulmonary:      Effort: Pulmonary effort is normal. No respiratory distress.      Breath sounds: Normal breath sounds and air entry.   Abdominal:      General: Bowel sounds are normal. There is no distension or abdominal bruit.      Palpations: Abdomen is soft. There is no hepatomegaly, splenomegaly, mass or pulsatile mass.      Tenderness: There is no abdominal tenderness. There is no right CVA tenderness, left CVA tenderness or guarding.   Musculoskeletal:         General: Normal range of motion.      Cervical back: Normal, full passive range of motion without pain, normal range of motion and neck supple.      Thoracic back: Normal.      Lumbar back: Normal.      Right lower leg: No edema.      Left lower leg: No edema.   Lymphadenopathy:      Cervical: No cervical adenopathy.   Skin:     General: Skin is warm and dry.      Capillary Refill: Capillary refill takes less than 2 seconds.   Neurological:      General: No focal deficit present.      Mental Status: He is alert and oriented to person, place, and time.      Cranial Nerves: Cranial nerves 2-12 are intact.      Sensory: Sensation is intact.      Motor: Motor function is intact.      Coordination: Coordination is intact.      Gait: Gait is intact.   Psychiatric:         Attention and Perception: Attention and perception normal.         Mood and Affect: Mood and affect normal.         Speech: Speech normal.         Behavior: Behavior normal. Behavior is cooperative.         Thought Content: Thought content normal.         Cognition and Memory: Cognition normal.         Judgment: Judgment normal.         Assessment & Plan  Routine general medical examination at health care facility  Well adult exam.  1. Age  appropriate preventative measures reviewed.   2. Encouraged healthy diet and exercise.  3. Immunizations- Reviewed, declines Flu and Prevnar 20 today  4. Labs- reviewed  5. Medications- Reviewed    *Follow-up in 1 year for repeat annual physical exam. Patient verbalizes understanding  regarding plan of care and all questions answered.         Excessive cerumen in both ear canals  Discussed use of OTC eye wax drops.  May return for irrigation if desired.        Type 2 diabetes mellitus without complication, without long-term current use of insulin (Multi)  A1C _6.4%_, at goal.   Continue metformin 1000 mg daily   Discussed medication desired effects, potential side effects, and how to administer the medication.   Non-pharmacological interventions such as low carb diet, high in vegetables and fruit discussed.   Educated on importance of physical activity.   Discussed signs and symptoms of hypoglycemia and need to present to the ED.   Follow up in 6 months or sooner if needed.   Patient verbalizes understanding regarding plan of care and all questions answered.         Essential hypertension   Chronic condition, stable at this visit  Continue Toprol XL 50 mg BID as prescribed by cardiology  Monitor home blood pressures.  Call if blood pressure consistently >140/90.  Continue follow up with cardiology as scheduled  Non pharmacological interventions such as lowering salt, saturated fats, cholesterol, and sugar diet discussed.  Increase physical activity, aim for 30 minutes 5 days per week as able.  Stress reduction interventions discussed.  Discussed signs and symptoms of major cardiovascular event and need to present to the ED.   Reevaluate in 6 months.         Mixed hyperlipidemia  Chronic condition, stable with medication  Continue atorvastatin and ezetimibe daily  Labs reviewed  Reports any new onset of muscle pain or weakness.  Continue with health diet low in saturated fats, cholesterol, sodium, and limit  sugars.  Exercise 30-45 minutes 5 days per week.  Follow up in 6 months.

## 2024-09-19 ASSESSMENT — VISUAL ACUITY: OU: 1

## 2024-09-19 NOTE — ASSESSMENT & PLAN NOTE
Chronic condition, stable with medication  Continue atorvastatin and ezetimibe daily  Labs reviewed  Reports any new onset of muscle pain or weakness.  Continue with health diet low in saturated fats, cholesterol, sodium, and limit sugars.  Exercise 30-45 minutes 5 days per week.  Follow up in 6 months.

## 2024-09-19 NOTE — ASSESSMENT & PLAN NOTE
Chronic condition, stable at this visit  Continue Toprol XL 50 mg BID as prescribed by cardiology  Monitor home blood pressures.  Call if blood pressure consistently >140/90.  Continue follow up with cardiology as scheduled  Non pharmacological interventions such as lowering salt, saturated fats, cholesterol, and sugar diet discussed.  Increase physical activity, aim for 30 minutes 5 days per week as able.  Stress reduction interventions discussed.  Discussed signs and symptoms of major cardiovascular event and need to present to the ED.   Reevaluate in 6 months.

## 2024-09-19 NOTE — ASSESSMENT & PLAN NOTE
A1C _6.4%_, at goal.   Continue metformin 1000 mg daily   Discussed medication desired effects, potential side effects, and how to administer the medication.   Non-pharmacological interventions such as low carb diet, high in vegetables and fruit discussed.   Educated on importance of physical activity.   Discussed signs and symptoms of hypoglycemia and need to present to the ED.   Follow up in 6 months or sooner if needed.   Patient verbalizes understanding regarding plan of care and all questions answered.

## 2024-10-04 DIAGNOSIS — I10 PRIMARY HYPERTENSION: ICD-10-CM

## 2024-10-04 DIAGNOSIS — E11.9 TYPE 2 DIABETES MELLITUS WITHOUT COMPLICATION, UNSPECIFIED WHETHER LONG TERM INSULIN USE (MULTI): ICD-10-CM

## 2024-10-07 RX ORDER — METFORMIN HYDROCHLORIDE 500 MG/1
1000 TABLET, EXTENDED RELEASE ORAL DAILY
Qty: 180 TABLET | Refills: 1 | Status: SHIPPED | OUTPATIENT
Start: 2024-10-07

## 2024-10-07 RX ORDER — METOPROLOL SUCCINATE 50 MG/1
50 TABLET, EXTENDED RELEASE ORAL EVERY 12 HOURS
Qty: 180 TABLET | Refills: 3 | Status: SHIPPED | OUTPATIENT
Start: 2024-10-07

## 2024-10-07 NOTE — TELEPHONE ENCOUNTER
Please send the attached prescription to the patient's pharmacy as soon as possible. Thank you!    Requested Prescriptions     Pending Prescriptions Disp Refills    metoprolol succinate XL (Toprol-XL) 50 mg 24 hr tablet [Pharmacy Med Name: METOPROLOL SUCC ER 50 MG TAB] 180 tablet 3     Sig: TAKE 1 TABLET BY MOUTH EVERY 12 HOURS

## 2024-11-13 ENCOUNTER — APPOINTMENT (OUTPATIENT)
Dept: CARDIOLOGY | Facility: CLINIC | Age: 74
End: 2024-11-13
Payer: MEDICARE

## 2024-12-31 ENCOUNTER — APPOINTMENT (OUTPATIENT)
Dept: CARDIOLOGY | Facility: CLINIC | Age: 74
End: 2024-12-31
Payer: MEDICARE

## 2025-01-22 ENCOUNTER — APPOINTMENT (OUTPATIENT)
Dept: CARDIOLOGY | Facility: CLINIC | Age: 75
End: 2025-01-22
Payer: MEDICARE

## 2025-01-22 VITALS
BODY MASS INDEX: 23.53 KG/M2 | HEART RATE: 77 BPM | OXYGEN SATURATION: 97 % | SYSTOLIC BLOOD PRESSURE: 129 MMHG | WEIGHT: 148 LBS | DIASTOLIC BLOOD PRESSURE: 76 MMHG

## 2025-01-22 DIAGNOSIS — I10 ESSENTIAL HYPERTENSION: ICD-10-CM

## 2025-01-22 DIAGNOSIS — I21.19 INFERIOR MI (MULTI): ICD-10-CM

## 2025-01-22 DIAGNOSIS — I24.9 ACUTE CORONARY SYNDROME (MULTI): Primary | ICD-10-CM

## 2025-01-22 DIAGNOSIS — I25.118 ATHEROSCLEROTIC HEART DISEASE OF NATIVE CORONARY ARTERY WITH OTHER FORMS OF ANGINA PECTORIS: ICD-10-CM

## 2025-01-22 DIAGNOSIS — I21.19 ACUTE ST ELEVATION MYOCARDIAL INFARCTION (STEMI) OF INFERIOR WALL (MULTI): ICD-10-CM

## 2025-01-22 DIAGNOSIS — E78.2 MIXED HYPERLIPIDEMIA: ICD-10-CM

## 2025-01-22 DIAGNOSIS — I20.9 ANGINA PECTORIS: ICD-10-CM

## 2025-01-22 DIAGNOSIS — R06.02 SOB (SHORTNESS OF BREATH): ICD-10-CM

## 2025-01-22 DIAGNOSIS — R07.2 PRECORDIAL PAIN: ICD-10-CM

## 2025-01-22 DIAGNOSIS — R00.1 BRADYCARDIA: ICD-10-CM

## 2025-01-22 PROCEDURE — 1123F ACP DISCUSS/DSCN MKR DOCD: CPT | Performed by: INTERNAL MEDICINE

## 2025-01-22 PROCEDURE — 3078F DIAST BP <80 MM HG: CPT | Performed by: INTERNAL MEDICINE

## 2025-01-22 PROCEDURE — 1160F RVW MEDS BY RX/DR IN RCRD: CPT | Performed by: INTERNAL MEDICINE

## 2025-01-22 PROCEDURE — 1157F ADVNC CARE PLAN IN RCRD: CPT | Performed by: INTERNAL MEDICINE

## 2025-01-22 PROCEDURE — 99214 OFFICE O/P EST MOD 30 MIN: CPT | Performed by: INTERNAL MEDICINE

## 2025-01-22 PROCEDURE — 1159F MED LIST DOCD IN RCRD: CPT | Performed by: INTERNAL MEDICINE

## 2025-01-22 PROCEDURE — 3074F SYST BP LT 130 MM HG: CPT | Performed by: INTERNAL MEDICINE

## 2025-01-22 PROCEDURE — 1126F AMNT PAIN NOTED NONE PRSNT: CPT | Performed by: INTERNAL MEDICINE

## 2025-01-22 ASSESSMENT — PAIN SCALES - GENERAL: PAINLEVEL_OUTOF10: 0-NO PAIN

## 2025-01-22 ASSESSMENT — ENCOUNTER SYMPTOMS
DEPRESSION: 0
OCCASIONAL FEELINGS OF UNSTEADINESS: 0
LOSS OF SENSATION IN FEET: 0

## 2025-01-22 NOTE — PROGRESS NOTES
History of present illness:  74 year old male patient here today following up on hx of CAD/Inferior STEMI status post PCI to RCA with LYLY, has  of the LAD, has EF of 60%. Patient return to my office for follow-up. For patient has been feeling overall okay. Denies having any chest pain or shortness of breath. Patient denies palpitations lightheadedness or dizziness. Tolerating his medications well.  [facility] reviewed his labs.  Reviewed his cath films again discussed with him option of stress test as he has  of the LAD distally as well as obtuse marginal 1.  He is feeling good he is hesitant to go for stress test or any repeated cardiac cath since he is feeling okay.     Past Medical History:   Diagnosis Date    Acute coronary syndrome (Multi) 11/14/2023    Acute ST elevation myocardial infarction (STEMI) of inferior wall (Multi) 11/14/2023    Atherosclerotic heart disease of native coronary artery with other forms of angina pectoris 05/30/2019    Bradycardia 11/14/2023    Chest pain 11/14/2023    Diabetes (Multi) 04/16/2019    Essential hypertension 11/14/2023    Inferior MI (Multi) 05/08/2018    Malignant neoplasm of prostate (Multi) 07/10/2020    Mixed hyperlipidemia 07/25/2018    Stage 1 chronic kidney disease 04/16/2019       Past Surgical History:   Procedure Laterality Date    VASECTOMY         Allergies   Allergen Reactions    Morphine Rash        reports that he has never smoked. He has never been exposed to tobacco smoke. He has never used smokeless tobacco. He reports current alcohol use. He reports that he does not use drugs.    Family History   Problem Relation Name Age of Onset    Heart disease Father Father     Heart attack Father Father        Patient's Medications   New Prescriptions    No medications on file   Previous Medications    ASPIRIN 81 MG EC TABLET    Take 1 tablet (81 mg) by mouth once daily.    ATORVASTATIN (LIPITOR) 80 MG TABLET    TAKE 1 TABLET BY MOUTH EVERY DAY    EZETIMIBE  (ZETIA) 10 MG TABLET    Take 1 tablet (10 mg) by mouth once daily.    LEUPROLIDE, 6 MONTH, 45 MG INJECTION    Inject 45 mg under the skin every 6 months.    METFORMIN  MG 24 HR TABLET    TAKE 2 TABLETS (1000 MG) BY MOUTH DAILY    METOPROLOL SUCCINATE XL (TOPROL-XL) 50 MG 24 HR TABLET    TAKE 1 TABLET BY MOUTH EVERY 12 HOURS    NITROGLYCERIN (NITROSTAT) 0.4 MG SL TABLET    Place 1 tablet (0.4 mg) under the tongue every 5 minutes if needed for chest pain.   Modified Medications    No medications on file   Discontinued Medications    No medications on file       Objective   Physical Exam  General: Patient in no acute distress   HEENT: Atraumatic normocephalic.  Neck: Supple, jugular venous pressure within normal limit.  No bruits  Lungs: Clear to auscultation bilaterally  Cardiovascular: Regular rate and rhythm, normal heart sounds, no murmurs rubs or gallops  Abdomen: Soft nontender nondistended.  Normal bowel sounds.  Extremities: Warm to touch, no edema.  Lab Review   No visits with results within 2 Month(s) from this visit.   Latest known visit with results is:   Lab on 09/09/2024   Component Date Value    WBC 09/09/2024 5.2     nRBC 09/09/2024 0.0     RBC 09/09/2024 4.40 (L)     Hemoglobin 09/09/2024 13.7     Hematocrit 09/09/2024 42.4     MCV 09/09/2024 96     MCH 09/09/2024 31.1     MCHC 09/09/2024 32.3     RDW 09/09/2024 13.6     Platelets 09/09/2024 247     Neutrophils % 09/09/2024 56.7     Immature Granulocytes %,* 09/09/2024 0.6     Lymphocytes % 09/09/2024 29.2     Monocytes % 09/09/2024 10.0     Eosinophils % 09/09/2024 2.5     Basophils % 09/09/2024 1.0     Neutrophils Absolute 09/09/2024 2.96     Immature Granulocytes Ab* 09/09/2024 0.03     Lymphocytes Absolute 09/09/2024 1.52     Monocytes Absolute 09/09/2024 0.52     Eosinophils Absolute 09/09/2024 0.13     Basophils Absolute 09/09/2024 0.05     Glucose 09/09/2024 109 (H)     Sodium 09/09/2024 142     Potassium 09/09/2024 4.7     Chloride  09/09/2024 106     Bicarbonate 09/09/2024 28     Anion Gap 09/09/2024 13     Urea Nitrogen 09/09/2024 24 (H)     Creatinine 09/09/2024 1.02     eGFR 09/09/2024 77     Calcium 09/09/2024 9.8     Albumin 09/09/2024 4.3     Alkaline Phosphatase 09/09/2024 69     Total Protein 09/09/2024 6.4     AST 09/09/2024 17     Bilirubin, Total 09/09/2024 0.9     ALT 09/09/2024 24     Cholesterol 09/09/2024 99     HDL-Cholesterol 09/09/2024 39.6     Cholesterol/HDL Ratio 09/09/2024 2.5     LDL Calculated 09/09/2024 46     VLDL 09/09/2024 13     Triglycerides 09/09/2024 66     Non HDL Cholesterol 09/09/2024 59     Hemoglobin A1C 09/09/2024 6.4 (H)     Estimated Average Glucose 09/09/2024 137     Color, Urine 09/09/2024 Light-Yellow     Appearance, Urine 09/09/2024 Clear     Specific Gravity, Urine 09/09/2024 1.020     pH, Urine 09/09/2024 6.5     Protein, Urine 09/09/2024 NEGATIVE     Glucose, Urine 09/09/2024 Normal     Blood, Urine 09/09/2024 0.06 (1+) (A)     Ketones, Urine 09/09/2024 NEGATIVE     Bilirubin, Urine 09/09/2024 NEGATIVE     Urobilinogen, Urine 09/09/2024 Normal     Nitrite, Urine 09/09/2024 NEGATIVE     Leukocyte Esterase, Urine 09/09/2024 25 Shazia/µL (A)     WBC, Urine 09/09/2024 1-5     RBC, Urine 09/09/2024 11-20 (A)     Squamous Epithelial Cell* 09/09/2024 1-9 (SPARSE)     Bacteria, Urine 09/09/2024 1+ (A)     Mucus, Urine 09/09/2024 FEW     Hyaline Casts, Urine 09/09/2024 OCCASIONAL (A)         Assessment/Plan   Patient Active Problem List   Diagnosis    Acute coronary syndrome (Multi)    Acute ST elevation myocardial infarction (STEMI) of inferior wall (Multi)    Bradycardia    Atherosclerotic heart disease of native coronary artery with other forms of angina pectoris    Calculus of kidney    Cholelithiasis    Diabetes (Multi)    Diverticulitis of colon    Elevated PSA    Essential hypertension    Impaired fasting glucose    Angina pectoris    Chest pain    Inferior MI (Multi)    Lower urinary tract symptoms  due to benign prostatic hyperplasia    Malignant neoplasm of prostate (Multi)    Mixed hyperlipidemia    Nausea & vomiting    Renal insufficiency    Retention of urine    Stage 1 chronic kidney disease    Subclinical hypothyroidism    Testicular hypofunction      74 year old male patient here today following up on hx of CAD/Inferior STEMI status post PCI to RCA with LYLY, has  of the LAD, has EF of 60%. Patient return to my office for follow-up. For patient has been feeling overall okay. Denies having any chest pain or shortness of breath. Patient denies palpitations lightheadedness or dizziness. Tolerating his medications well.  [facility] reviewed his labs.  Reviewed his cath films again discussed with him option of stress test as he has  of the LAD distally as well as obtuse marginal 1.  He is feeling good he is hesitant to go for stress test or any repeated cardiac cath since he is feeling okay.     At this point patient is stable.  On optimal medical therapy.  Blood pressure and heart rate well-controlled.  LDL very well-controlled.  Continue current medications.  I do not see strong evidence that revascularization of his LAD will change the outcome since he has no angina and his ejection fraction is preserved.  Will follow-up in 6 months.    Alexa Melchor MD

## 2025-02-03 DIAGNOSIS — E78.2 MIXED HYPERLIPIDEMIA: ICD-10-CM

## 2025-02-03 NOTE — TELEPHONE ENCOUNTER
LOV: 2025  Up Comin25    Requested Prescriptions     Pending Prescriptions Disp Refills    atorvastatin (Lipitor) 80 mg tablet [Pharmacy Med Name: ATORVASTATIN 80 MG TABLET] 90 tablet 3     Sig: TAKE 1 TABLET BY MOUTH EVERY DAY

## 2025-02-04 DIAGNOSIS — E11.9 TYPE 2 DIABETES MELLITUS WITHOUT COMPLICATION, UNSPECIFIED WHETHER LONG TERM INSULIN USE (MULTI): ICD-10-CM

## 2025-02-04 RX ORDER — ATORVASTATIN CALCIUM 80 MG/1
80 TABLET, FILM COATED ORAL DAILY
Qty: 90 TABLET | Refills: 3 | Status: SHIPPED | OUTPATIENT
Start: 2025-02-04

## 2025-02-04 RX ORDER — METFORMIN HYDROCHLORIDE 500 MG/1
1000 TABLET, EXTENDED RELEASE ORAL DAILY
Qty: 180 TABLET | Refills: 1 | Status: SHIPPED | OUTPATIENT
Start: 2025-02-04

## 2025-03-13 LAB
ALBUMIN SERPL-MCNC: 4.1 G/DL (ref 3.6–5.1)
ALP SERPL-CCNC: 68 U/L (ref 35–144)
ALT SERPL-CCNC: 19 U/L (ref 9–46)
ANION GAP SERPL CALCULATED.4IONS-SCNC: 8 MMOL/L (CALC) (ref 7–17)
AST SERPL-CCNC: 17 U/L (ref 10–35)
BILIRUB SERPL-MCNC: 0.5 MG/DL (ref 0.2–1.2)
BUN SERPL-MCNC: 17 MG/DL (ref 7–25)
CALCIUM SERPL-MCNC: 9.1 MG/DL (ref 8.6–10.3)
CHLORIDE SERPL-SCNC: 110 MMOL/L (ref 98–110)
CHOLEST SERPL-MCNC: 88 MG/DL
CHOLEST/HDLC SERPL: 2.4 (CALC)
CO2 SERPL-SCNC: 25 MMOL/L (ref 20–32)
CREAT SERPL-MCNC: 0.85 MG/DL (ref 0.7–1.28)
EGFRCR SERPLBLD CKD-EPI 2021: 91 ML/MIN/1.73M2
EST. AVERAGE GLUCOSE BLD GHB EST-MCNC: 146 MG/DL
EST. AVERAGE GLUCOSE BLD GHB EST-SCNC: 8.1 MMOL/L
GLUCOSE SERPL-MCNC: 138 MG/DL (ref 65–99)
HBA1C MFR BLD: 6.7 % OF TOTAL HGB
HDLC SERPL-MCNC: 37 MG/DL
LDLC SERPL CALC-MCNC: 39 MG/DL (CALC)
NONHDLC SERPL-MCNC: 51 MG/DL (CALC)
POTASSIUM SERPL-SCNC: 4.3 MMOL/L (ref 3.5–5.3)
PROT SERPL-MCNC: 6.2 G/DL (ref 6.1–8.1)
SODIUM SERPL-SCNC: 143 MMOL/L (ref 135–146)
TRIGL SERPL-MCNC: 43 MG/DL

## 2025-03-17 ENCOUNTER — TELEPHONE (OUTPATIENT)
Dept: PRIMARY CARE | Facility: CLINIC | Age: 75
End: 2025-03-17

## 2025-03-17 ENCOUNTER — APPOINTMENT (OUTPATIENT)
Dept: PRIMARY CARE | Facility: CLINIC | Age: 75
End: 2025-03-17
Payer: MEDICARE

## 2025-03-17 VITALS
BODY MASS INDEX: 24.04 KG/M2 | TEMPERATURE: 97.7 F | HEIGHT: 66 IN | SYSTOLIC BLOOD PRESSURE: 140 MMHG | DIASTOLIC BLOOD PRESSURE: 82 MMHG | OXYGEN SATURATION: 98 % | HEART RATE: 70 BPM | WEIGHT: 149.6 LBS

## 2025-03-17 DIAGNOSIS — I10 ESSENTIAL HYPERTENSION: ICD-10-CM

## 2025-03-17 DIAGNOSIS — E11.9 TYPE 2 DIABETES MELLITUS WITHOUT COMPLICATION, WITHOUT LONG-TERM CURRENT USE OF INSULIN (MULTI): ICD-10-CM

## 2025-03-17 DIAGNOSIS — E78.2 MIXED HYPERLIPIDEMIA: ICD-10-CM

## 2025-03-17 DIAGNOSIS — I10 ESSENTIAL HYPERTENSION: Primary | ICD-10-CM

## 2025-03-17 PROCEDURE — 1123F ACP DISCUSS/DSCN MKR DOCD: CPT

## 2025-03-17 PROCEDURE — 1036F TOBACCO NON-USER: CPT

## 2025-03-17 PROCEDURE — 3077F SYST BP >= 140 MM HG: CPT

## 2025-03-17 PROCEDURE — 1158F ADVNC CARE PLAN TLK DOCD: CPT

## 2025-03-17 PROCEDURE — 1157F ADVNC CARE PLAN IN RCRD: CPT

## 2025-03-17 PROCEDURE — 3079F DIAST BP 80-89 MM HG: CPT

## 2025-03-17 PROCEDURE — 1159F MED LIST DOCD IN RCRD: CPT

## 2025-03-17 PROCEDURE — G8433 SCR FOR DEP NOT CPT DOC RSN: HCPCS

## 2025-03-17 PROCEDURE — 99214 OFFICE O/P EST MOD 30 MIN: CPT

## 2025-03-17 PROCEDURE — 3008F BODY MASS INDEX DOCD: CPT

## 2025-03-17 PROCEDURE — G2211 COMPLEX E/M VISIT ADD ON: HCPCS

## 2025-03-17 PROCEDURE — 1160F RVW MEDS BY RX/DR IN RCRD: CPT

## 2025-03-17 RX ORDER — TRIAMCINOLONE ACETONIDE 1 MG/G
CREAM TOPICAL
COMMUNITY
Start: 2024-12-04

## 2025-03-17 ASSESSMENT — COLUMBIA-SUICIDE SEVERITY RATING SCALE - C-SSRS
1. IN THE PAST MONTH, HAVE YOU WISHED YOU WERE DEAD OR WISHED YOU COULD GO TO SLEEP AND NOT WAKE UP?: NO
6. HAVE YOU EVER DONE ANYTHING, STARTED TO DO ANYTHING, OR PREPARED TO DO ANYTHING TO END YOUR LIFE?: NO
2. HAVE YOU ACTUALLY HAD ANY THOUGHTS OF KILLING YOURSELF?: NO

## 2025-03-17 NOTE — ASSESSMENT & PLAN NOTE
Chronic condition, stable with medication  Continue atorvastatin 80 mg by mouth daily and Zetia daily  Labs: Reviewed with patient  Reports any new onset of muscle pain or weakness.  Continue with health diet low in saturated fats, cholesterol, sodium, and limit sugars.  Exercise 30-45 minutes 5 days per week.  Follow up in 6 months.

## 2025-03-17 NOTE — ASSESSMENT & PLAN NOTE
Chronic condition, stable at this visit  Continue metoprolol 50 twice daily as prescribed.  Monitor home blood pressures.  Call if blood pressure consistently >140/90.  Non pharmacological interventions such as lowering salt, saturated fats, cholesterol, and sugar diet discussed.  Increase physical activity, aim for 30 minutes 5 days per week as able.  Stress reduction interventions discussed.  Labs reviewed with patient, electrolytes stable, renal function stable  Discussed signs and symptoms of major cardiovascular event and need to present to the ED.   Reevaluate in 6 months.

## 2025-03-17 NOTE — PROGRESS NOTES
"Subjective     Patient ID: Tej Diane is a 74 y.o. male who presents for Follow-up.      HPI  Tej presents for interval follow-up of chronic conditions.    Tej presents for follow up of essential hypertension.   Taking Toprol-XL 50 mg twice daily  Tolerating medications well.  He does follow-up with cardiology every 6 months  Denies change in vision, headache, or dizziness.   No complaints of chest pain, palpitations, or shortness of breath.    Hyperlipidemia Review:  Lipid panel: TC 88, HDL 37, LDL 39, TG 73  Medications: Currently taking atorvastatin 80 mg by mouth daily, ezetimibe 10 mg by mouth daily, and ASA 81 mg by mouth daily.  Denies any side effects including myalgia or muscle weakness.    Diabetes follow up:  Current medications: Metformin XR 1000 mg daily  A1c 6.7%, previously 6.4%  Does not check home glucose levels.  Sees Dr. Law- last visit 6/2024  No podiatry- foot exam completed today    Follows closely with urology for history of prostate cancer.       Patient's recent visit notes, medication and allergy lists, past medical surgical social hx, immunization, vitals, problem list, recent tests were reviewed by me for pertinence to this visit.        Review of Systems  All other systems have been reviewed and are negative except as noted in the HPI.         Objective   /82 (BP Location: Left arm, Patient Position: Sitting, BP Cuff Size: Adult)   Pulse 70   Temp 36.5 °C (97.7 °F) (Temporal)   Ht 1.676 m (5' 6\")   Wt 67.9 kg (149 lb 9.6 oz)   SpO2 98%   BMI 24.15 kg/m²       Physical Exam  Vitals and nursing note reviewed.   Constitutional:       General: He is not in acute distress.     Appearance: Normal appearance. He is not ill-appearing.   Neck:      Vascular: No carotid bruit.   Cardiovascular:      Rate and Rhythm: Normal rate and regular rhythm.      Pulses: Normal pulses.           Dorsalis pedis pulses are 2+ on the right side and 2+ on the left side.        " Posterior tibial pulses are 2+ on the right side and 2+ on the left side.      Heart sounds: Normal heart sounds, S1 normal and S2 normal. No murmur heard.  Pulmonary:      Effort: Pulmonary effort is normal. No respiratory distress.      Breath sounds: Normal breath sounds and air entry.   Musculoskeletal:      Cervical back: Normal range of motion and neck supple. No rigidity or tenderness.      Right lower leg: No edema.      Left lower leg: No edema.   Feet:      Right foot:      Protective Sensation: 10 sites tested.  10 sites sensed.      Skin integrity: Skin integrity normal.      Toenail Condition: Right toenails are normal.      Left foot:      Protective Sensation: 10 sites tested.  10 sites sensed.      Skin integrity: Skin integrity normal.      Toenail Condition: Left toenails are normal.   Lymphadenopathy:      Cervical: No cervical adenopathy.   Skin:     General: Skin is warm and dry.      Capillary Refill: Capillary refill takes less than 2 seconds.   Neurological:      General: No focal deficit present.      Mental Status: He is alert. Mental status is at baseline.   Psychiatric:         Mood and Affect: Mood normal.         Behavior: Behavior normal. Behavior is cooperative.         Thought Content: Thought content normal.         Judgment: Judgment normal.             Assessment & Plan  Essential hypertension  Chronic condition, stable at this visit  Continue metoprolol 50 twice daily as prescribed.  Monitor home blood pressures.  Call if blood pressure consistently >140/90.  Non pharmacological interventions such as lowering salt, saturated fats, cholesterol, and sugar diet discussed.  Increase physical activity, aim for 30 minutes 5 days per week as able.  Stress reduction interventions discussed.  Labs reviewed with patient, electrolytes stable, renal function stable  Discussed signs and symptoms of major cardiovascular event and need to present to the ED.   Reevaluate in 6 months.          Mixed hyperlipidemia  Chronic condition, stable with medication  Continue atorvastatin 80 mg by mouth daily and Zetia daily  Labs: Reviewed with patient  Reports any new onset of muscle pain or weakness.  Continue with health diet low in saturated fats, cholesterol, sodium, and limit sugars.  Exercise 30-45 minutes 5 days per week.  Follow up in 6 months.          Type 2 diabetes mellitus without complication, without long-term current use of insulin (Multi)  A1C 6.7%, stable.   Continue metformin as prescribed  Discussed medication desired effects, potential side effects, and how to administer the medication.   Check a fasting sugar first thing in the AM once daily and keep a log of the results to bring to your next office visit.  Please contact office if your sugars are consistently >140.  Non-pharmacological interventions such as low carb diet, high in vegetables and fruit discussed.   Educated on importance of physical activity.   Discussed signs and symptoms of hypoglycemia and need to present to the ED.   Follow up in 6 months or sooner if needed.   Patient verbalizes understanding regarding plan of care and all questions answered.                 Jerri Avitia, APRN-CNP

## 2025-03-17 NOTE — ASSESSMENT & PLAN NOTE
A1C 6.7%, stable.   Continue metformin as prescribed  Discussed medication desired effects, potential side effects, and how to administer the medication.   Check a fasting sugar first thing in the AM once daily and keep a log of the results to bring to your next office visit.  Please contact office if your sugars are consistently >140.  Non-pharmacological interventions such as low carb diet, high in vegetables and fruit discussed.   Educated on importance of physical activity.   Discussed signs and symptoms of hypoglycemia and need to present to the ED.   Follow up in 6 months or sooner if needed.   Patient verbalizes understanding regarding plan of care and all questions answered.

## 2025-06-27 DIAGNOSIS — E78.2 MIXED HYPERLIPIDEMIA: ICD-10-CM

## 2025-06-30 RX ORDER — EZETIMIBE 10 MG/1
10 TABLET ORAL DAILY
Qty: 90 TABLET | Refills: 3 | Status: SHIPPED | OUTPATIENT
Start: 2025-06-30 | End: 2026-06-25

## 2025-07-17 ENCOUNTER — APPOINTMENT (OUTPATIENT)
Age: 75
End: 2025-07-17
Payer: MEDICARE

## 2025-07-17 VITALS
SYSTOLIC BLOOD PRESSURE: 136 MMHG | DIASTOLIC BLOOD PRESSURE: 70 MMHG | RESPIRATION RATE: 14 BRPM | HEIGHT: 67 IN | BODY MASS INDEX: 24.33 KG/M2 | OXYGEN SATURATION: 97 % | HEART RATE: 63 BPM | WEIGHT: 155 LBS

## 2025-07-17 DIAGNOSIS — I21.19 ACUTE ST ELEVATION MYOCARDIAL INFARCTION (STEMI) OF INFERIOR WALL (MULTI): ICD-10-CM

## 2025-07-17 DIAGNOSIS — R00.1 BRADYCARDIA: ICD-10-CM

## 2025-07-17 DIAGNOSIS — I20.9 ANGINA PECTORIS: ICD-10-CM

## 2025-07-17 DIAGNOSIS — I24.9 ACUTE CORONARY SYNDROME (MULTI): ICD-10-CM

## 2025-07-17 DIAGNOSIS — R07.2 PRECORDIAL PAIN: ICD-10-CM

## 2025-07-17 DIAGNOSIS — I21.19 INFERIOR MI (MULTI): ICD-10-CM

## 2025-07-17 DIAGNOSIS — E78.2 MIXED HYPERLIPIDEMIA: ICD-10-CM

## 2025-07-17 DIAGNOSIS — I25.118 ATHEROSCLEROTIC HEART DISEASE OF NATIVE CORONARY ARTERY WITH OTHER FORMS OF ANGINA PECTORIS: ICD-10-CM

## 2025-07-17 DIAGNOSIS — I10 ESSENTIAL HYPERTENSION: ICD-10-CM

## 2025-07-17 DIAGNOSIS — I10 PRIMARY HYPERTENSION: Primary | ICD-10-CM

## 2025-07-17 PROCEDURE — 3008F BODY MASS INDEX DOCD: CPT | Performed by: INTERNAL MEDICINE

## 2025-07-17 PROCEDURE — 1126F AMNT PAIN NOTED NONE PRSNT: CPT | Performed by: INTERNAL MEDICINE

## 2025-07-17 PROCEDURE — 1160F RVW MEDS BY RX/DR IN RCRD: CPT | Performed by: INTERNAL MEDICINE

## 2025-07-17 PROCEDURE — 99214 OFFICE O/P EST MOD 30 MIN: CPT | Performed by: INTERNAL MEDICINE

## 2025-07-17 PROCEDURE — 3078F DIAST BP <80 MM HG: CPT | Performed by: INTERNAL MEDICINE

## 2025-07-17 PROCEDURE — 1159F MED LIST DOCD IN RCRD: CPT | Performed by: INTERNAL MEDICINE

## 2025-07-17 PROCEDURE — 3075F SYST BP GE 130 - 139MM HG: CPT | Performed by: INTERNAL MEDICINE

## 2025-07-17 RX ORDER — CARVEDILOL 12.5 MG/1
12.5 TABLET ORAL
Qty: 180 TABLET | Refills: 3 | Status: SHIPPED | OUTPATIENT
Start: 2025-07-17 | End: 2026-07-17

## 2025-07-17 ASSESSMENT — PATIENT HEALTH QUESTIONNAIRE - PHQ9
2. FEELING DOWN, DEPRESSED OR HOPELESS: NOT AT ALL
1. LITTLE INTEREST OR PLEASURE IN DOING THINGS: NOT AT ALL
SUM OF ALL RESPONSES TO PHQ9 QUESTIONS 1 AND 2: 0

## 2025-07-17 ASSESSMENT — ENCOUNTER SYMPTOMS
LOSS OF SENSATION IN FEET: 0
DEPRESSION: 0
OCCASIONAL FEELINGS OF UNSTEADINESS: 0

## 2025-07-17 ASSESSMENT — LIFESTYLE VARIABLES
HOW MANY STANDARD DRINKS CONTAINING ALCOHOL DO YOU HAVE ON A TYPICAL DAY: 1 OR 2
AUDIT TOTAL SCORE: 1
HOW OFTEN DO YOU HAVE SIX OR MORE DRINKS ON ONE OCCASION: NEVER
HOW OFTEN DO YOU HAVE A DRINK CONTAINING ALCOHOL: MONTHLY OR LESS
SKIP TO QUESTIONS 9-10: 1
HAVE YOU OR SOMEONE ELSE BEEN INJURED AS A RESULT OF YOUR DRINKING: NO
AUDIT-C TOTAL SCORE: 1
HAS A RELATIVE, FRIEND, DOCTOR, OR ANOTHER HEALTH PROFESSIONAL EXPRESSED CONCERN ABOUT YOUR DRINKING OR SUGGESTED YOU CUT DOWN: NO

## 2025-07-17 ASSESSMENT — PAIN SCALES - GENERAL: PAINLEVEL_OUTOF10: 0-NO PAIN

## 2025-07-17 NOTE — PROGRESS NOTES
[facility]    Interventional Cardiology    History of present illness:  74 year old male patient here today following up on hx of CAD/Inferior STEMI status post PCI to RCA with LYLY, has  of the LAD, has EF of 60%. Patient return to my office for follow-up. For patient has been feeling overall okay. Denies having any chest pain or shortness of breath. Patient denies palpitations lightheadedness or dizziness. Tolerating his medications well.  Target reviewed his labs.  Reviewed his cath films again discussed with him option of stress test as he has  of the LAD distally as well as obtuse marginal 1.  He is feeling good he is hesitant to go for stress test or any repeated cardiac cath since he is feeling okay.     Medical History[1]    Surgical History[2]    Allergies[3]     reports that he has never smoked. He has never been exposed to tobacco smoke. He has never used smokeless tobacco. He reports current alcohol use. He reports that he does not use drugs.    Family History[4]    Patient's Medications   New Prescriptions    No medications on file   Previous Medications    ASPIRIN 81 MG EC TABLET    Take 1 tablet (81 mg) by mouth once daily.    ATORVASTATIN (LIPITOR) 80 MG TABLET    TAKE 1 TABLET BY MOUTH EVERY DAY    EZETIMIBE (ZETIA) 10 MG TABLET    Take 1 tablet (10 mg) by mouth once daily.    LEUPROLIDE, 6 MONTH, 45 MG INJECTION    Inject 45 mg under the skin every 6 months.    METFORMIN  MG 24 HR TABLET    TAKE 2 TABLETS (1000 MG) BY MOUTH DAILY    METOPROLOL SUCCINATE XL (TOPROL-XL) 50 MG 24 HR TABLET    TAKE 1 TABLET BY MOUTH EVERY 12 HOURS    NITROGLYCERIN (NITROSTAT) 0.4 MG SL TABLET    Place 1 tablet (0.4 mg) under the tongue every 5 minutes if needed for chest pain.    TRIAMCINOLONE (KENALOG) 0.1 % CREAM    APPLY TO AFFECTED AREAS ON LOWER LEGS TWICE A DAY AS NEEDED FLARES   Modified Medications    No medications on file   Discontinued  Medications    No medications on file       Objective   Physical Exam  General: Patient in no acute distress   HEENT: Atraumatic normocephalic.  Neck: Supple, jugular venous pressure within normal limit.  No bruits  Lungs: Clear to auscultation bilaterally  Cardiovascular: Regular rate and rhythm, normal heart sounds, no murmurs rubs or gallops  Abdomen: Soft nontender nondistended.  Normal bowel sounds.  Extremities: Warm to touch, no edema.    Lab Review   No visits with results within 2 Month(s) from this visit.   Latest known visit with results is:   Orders Only on 03/12/2025   Component Date Value    CHOLESTEROL, TOTAL 03/12/2025 88     HDL CHOLESTEROL 03/12/2025 37 (L)     TRIGLYCERIDES 03/12/2025 43     LDL-CHOLESTEROL 03/12/2025 39     CHOL/HDLC RATIO 03/12/2025 2.4     NON HDL CHOLESTEROL 03/12/2025 51     GLUCOSE 03/12/2025 138 (H)     UREA NITROGEN (BUN) 03/12/2025 17     CREATININE 03/12/2025 0.85     EGFR 03/12/2025 91     SODIUM 03/12/2025 143     POTASSIUM 03/12/2025 4.3     CHLORIDE 03/12/2025 110     CARBON DIOXIDE 03/12/2025 25     ELECTROLYTE BALANCE 03/12/2025 8     CALCIUM 03/12/2025 9.1     PROTEIN, TOTAL 03/12/2025 6.2     ALBUMIN 03/12/2025 4.1     BILIRUBIN, TOTAL 03/12/2025 0.5     ALKALINE PHOSPHATASE 03/12/2025 68     AST 03/12/2025 17     ALT 03/12/2025 19     HEMOGLOBIN A1c 03/12/2025 6.7 (H)     eAG (mg/dL) 03/12/2025 146     eAG (mmol/L) 03/12/2025 8.1         Assessment/Plan   Problem List[5]   74 year old male patient here today following up on hx of CAD/Inferior STEMI status post PCI to RCA with LYLY, has  of the LAD, has EF of 60%. Patient return to my office for follow-up. For patient has been feeling overall okay. Denies having any chest pain or shortness of breath. Patient denies palpitations lightheadedness or dizziness. Tolerating his medications well.  Target reviewed his labs.  Reviewed his cath films again discussed with him option of stress test as he has  of the LAD  distally as well as obtuse marginal 1.  He is feeling good he is hesitant to go for stress test or any repeated cardiac cath since he is feeling okay.      At this point patient is stable.  On optimal medical therapy.  Will switch his metoprolol to carvedilol to control better his blood pressure.  Otherwise continue current medications.  His LDL is at target.  Will follow-up in 12 months.  Patient to call if there is any change in status.    Alexa Melchor MD              [1]   Past Medical History:  Diagnosis Date    Acute coronary syndrome (Multi) 11/14/2023    Acute ST elevation myocardial infarction (STEMI) of inferior wall (Multi) 11/14/2023    Atherosclerotic heart disease of native coronary artery with other forms of angina pectoris 05/30/2019    Bradycardia 11/14/2023    Chest pain 11/14/2023    Diabetes (Multi) 04/16/2019    Essential hypertension 11/14/2023    Inferior MI (Multi) 05/08/2018    Malignant neoplasm of prostate (Multi) 07/10/2020    Mixed hyperlipidemia 07/25/2018    Stage 1 chronic kidney disease 04/16/2019   [2]   Past Surgical History:  Procedure Laterality Date    VASECTOMY     [3]   Allergies  Allergen Reactions    Morphine Rash   [4]   Family History  Problem Relation Name Age of Onset    Heart disease Father Father     Heart attack Father Father    [5]   Patient Active Problem List  Diagnosis    Acute coronary syndrome (Multi)    Acute ST elevation myocardial infarction (STEMI) of inferior wall (Multi)    Bradycardia    Atherosclerotic heart disease of native coronary artery with other forms of angina pectoris    Calculus of kidney    Cholelithiasis    Diabetes (Multi)    Diverticulitis of colon    Elevated PSA    Essential hypertension    Impaired fasting glucose    Angina pectoris    Chest pain    Inferior MI (Multi)    Lower urinary tract symptoms due to benign prostatic hyperplasia    Malignant neoplasm of prostate (Multi)    Mixed hyperlipidemia    Nausea & vomiting    Renal  insufficiency    Retention of urine    Stage 1 chronic kidney disease    Subclinical hypothyroidism    Testicular hypofunction

## 2025-07-31 DIAGNOSIS — E11.9 TYPE 2 DIABETES MELLITUS WITHOUT COMPLICATION, UNSPECIFIED WHETHER LONG TERM INSULIN USE: ICD-10-CM

## 2025-07-31 RX ORDER — METFORMIN HYDROCHLORIDE 500 MG/1
1000 TABLET, EXTENDED RELEASE ORAL DAILY
Qty: 180 TABLET | Refills: 1 | Status: SHIPPED | OUTPATIENT
Start: 2025-07-31

## 2025-08-13 DIAGNOSIS — I10 PRIMARY HYPERTENSION: Primary | ICD-10-CM

## 2025-08-22 RX ORDER — METOPROLOL SUCCINATE 50 MG/1
50 TABLET, EXTENDED RELEASE ORAL 2 TIMES DAILY
Qty: 180 TABLET | Refills: 3 | Status: SHIPPED | OUTPATIENT
Start: 2025-08-22 | End: 2027-08-12

## 2025-09-22 ENCOUNTER — APPOINTMENT (OUTPATIENT)
Dept: PRIMARY CARE | Facility: CLINIC | Age: 75
End: 2025-09-22
Payer: MEDICARE

## 2026-01-19 ENCOUNTER — APPOINTMENT (OUTPATIENT)
Dept: CARDIOLOGY | Facility: CLINIC | Age: 76
End: 2026-01-19
Payer: MEDICARE